# Patient Record
Sex: MALE | Race: WHITE
[De-identification: names, ages, dates, MRNs, and addresses within clinical notes are randomized per-mention and may not be internally consistent; named-entity substitution may affect disease eponyms.]

---

## 2018-04-21 ENCOUNTER — HOSPITAL ENCOUNTER (EMERGENCY)
Dept: HOSPITAL 41 - JD.ED | Age: 33
Discharge: HOME | End: 2018-04-21
Payer: COMMERCIAL

## 2018-04-21 DIAGNOSIS — J01.90: Primary | ICD-10-CM

## 2018-04-21 DIAGNOSIS — J02.9: ICD-10-CM

## 2018-04-21 NOTE — EDM.PDOC
ED HPI GENERAL MEDICAL PROBLEM





- General


Chief Complaint: Respiratory Problem


Stated Complaint: SORE THROAT AND CONGESTION


Time Seen by Provider: 04/21/18 22:12


Source of Information: Reports: Patient


History Limitations: Reports: No Limitations





- History of Present Illness


INITIAL COMMENTS - FREE TEXT/NARRATIVE: 





This is a 33-year-old  male. He is been back to work for the last 5 

days. He noted 5 days ago some nasal congestion and drainage. That seemed to be 

his up and now he's got a persistent cough and mild sore throat. He's been able 

to eat and drink without difficulty but the biggest problem is sleeping at 

night time. He states when he lies down he gets a lot of congestion in the back 

of his throat which makes him cough persistently and he is waking up feeling 

short of breath due to the coughing and the congestion that collects in the 

back of his throat. He's had no fever no chills no nausea or vomiting. He has 

been trying to drink a lot of fluids but he's not been taking any sort of 

decongestants or any sort of cough syrups to help him sleep.


  ** Throat


Pain Score (Numeric/FACES): 4





- Related Data


 Allergies











Allergy/AdvReac Type Severity Reaction Status Date / Time


 


No Known Allergies Allergy   Verified 04/21/18 22:04











Home Meds: 


 Home Meds





Amoxicillin/Clavulanate K [Augmentin 500-125 MG] 1 tab PO BID #14 tablet 04/21/ 18 [Rx]











Past Medical History





- Past Health History


Medical/Surgical History: Denies Medical/Surgical History





Social & Family History





- Tobacco Use


Smoking Status *Q: Never Smoker





- Caffeine Use


Caffeine Use: Reports: Coffee, Energy Drinks, Soda





- Recreational Drug Use


Recreational Drug Use: No





ED ROS GENERAL





- Review of Systems


Review Of Systems: See Below


Constitutional: Denies: Fever, Chills


HEENT: Reports: Rhinitis, Sinus Problem


Respiratory: Reports: Cough.  Denies: Shortness of Breath, Wheezing


Cardiovascular: Reports: No Symptoms


Endocrine: Reports: No Symptoms


GI/Abdominal: Reports: No Symptoms


: Reports: No Symptoms


Musculoskeletal: Reports: No Symptoms


Skin: Reports: No Symptoms


Neurological: Reports: No Symptoms


Psychiatric: Reports: No Symptoms


Hematologic/Lymphatic: Reports: No Symptoms





ED EXAM, GENERAL





- Physical Exam


Exam: See Below


Exam Limited By: No Limitations


General Appearance: Alert, WD/WN, No Apparent Distress


Eye Exam: Bilateral Eye: Normal Inspection


Ears: Normal External Exam, Normal Canal, Normal TMs


Nose: Normal Inspection


Throat/Mouth: Normal Inspection, Normal Lips, Normal Oropharynx, Normal Voice, 

No Airway Compromise, Other (There is no obvious exudates noted in the 

oropharynx area, no obvious lymphadenopathy at the angle of the jaws, there is 

nasal congestion noted and appears to have some postnasal drainage)


Head: Normocephalic


Neck: Supple


Respiratory/Chest: No Respiratory Distress, Lungs Clear, Normal Breath Sounds


Cardiovascular: Regular Rate, Rhythm, No Murmur


GI/Abdominal: Soft


Back Exam: Full Range of Motion


Extremities: Normal Inspection, Normal Range of Motion


Neurological: Alert, Oriented


Psychiatric: Normal Affect, Normal Mood


Skin Exam: Warm, Dry





Course





- Vital Signs


Last Recorded V/S: 


 Last Vital Signs











Temp  97.8 F   04/21/18 22:03


 


Pulse  66   04/21/18 22:03


 


Resp  20   04/21/18 22:03


 


BP  155/79 H  04/21/18 22:03


 


Pulse Ox  96   04/21/18 22:03














Departure





- Departure


Time of Disposition: 22:22


Disposition: Home, Self-Care 01


Condition: Good


Clinical Impression: 


Acute sinusitis


Qualifiers:


 Sinusitis location: unspecified location Recurrence: non-recurrent Qualified 

Code(s): J01.90 - Acute sinusitis, unspecified





Acute pharyngitis


Qualifiers:


 Pharyngitis/tonsillitis etiology: unspecified etiology Qualified Code(s): 

J02.9 - Acute pharyngitis, unspecified








- Discharge Information


Prescriptions: 


Amoxicillin/Clavulanate K [Augmentin 500-125 MG] 1 tab PO BID #14 tablet


Referrals: 


PCP,None [Primary Care Provider] - 


Forms:  ED Department Discharge, ED Return to Work/School Form


Additional Instructions: 


Get the antibiotics and start taking them tomorrow, also you need to get some 

Robitussin or Robitussin-DM to take before going to bed at night to help calm 

the cough, also during the day did some Chlor-[place] or some Claritin without 

the D and take that to help with the congestion and the drainage, during the 

day you can use cough lozenges as well to help keep the throat moist and 

decrease the soreness, drink lots of fluids but no sugar such as sodas or sweet 

tea, follow-up with your family physician in about a week's time or return to 

the ER if needed